# Patient Record
Sex: FEMALE | Race: WHITE | NOT HISPANIC OR LATINO
[De-identification: names, ages, dates, MRNs, and addresses within clinical notes are randomized per-mention and may not be internally consistent; named-entity substitution may affect disease eponyms.]

---

## 2022-02-01 ENCOUNTER — APPOINTMENT (OUTPATIENT)
Dept: OBGYN | Facility: CLINIC | Age: 67
End: 2022-02-01
Payer: MEDICARE

## 2022-02-01 VITALS
WEIGHT: 150 LBS | DIASTOLIC BLOOD PRESSURE: 72 MMHG | BODY MASS INDEX: 28.32 KG/M2 | HEIGHT: 61 IN | SYSTOLIC BLOOD PRESSURE: 116 MMHG

## 2022-02-01 DIAGNOSIS — B00.9 HERPESVIRAL INFECTION, UNSPECIFIED: ICD-10-CM

## 2022-02-01 DIAGNOSIS — Z11.51 ENCOUNTER FOR SCREENING FOR HUMAN PAPILLOMAVIRUS (HPV): ICD-10-CM

## 2022-02-01 DIAGNOSIS — N95.2 POSTMENOPAUSAL ATROPHIC VAGINITIS: ICD-10-CM

## 2022-02-01 PROBLEM — Z00.00 ENCOUNTER FOR PREVENTIVE HEALTH EXAMINATION: Status: ACTIVE | Noted: 2022-02-01

## 2022-02-01 PROCEDURE — G0101: CPT

## 2022-02-01 NOTE — PHYSICAL EXAM
[Chaperone Present] : A chaperone was present in the examining room during all aspects of the physical examination [Appropriately responsive] : appropriately responsive [Alert] : alert [No Acute Distress] : no acute distress [No Lymphadenopathy] : no lymphadenopathy [Soft] : soft [Non-tender] : non-tender [Non-distended] : non-distended [No HSM] : No HSM [No Lesions] : no lesions [No Mass] : no mass [Oriented x3] : oriented x3 [FreeTextEntry3] : mobile thyroid, no masses, no nodules [FreeTextEntry6] : No cervical or axillary lymphadenopathy. [Examination Of The Breasts] : a normal appearance [No Masses] : no breast masses were palpable [Labia Majora] : normal [Labia Minora] : normal [Atrophy] : atrophy [Normal] : normal [Uterine Adnexae] : normal [FreeTextEntry1] : no HSV lesions

## 2022-02-01 NOTE — HISTORY OF PRESENT ILLNESS
[N] : Patient does not use contraception [Y] : Positive pregnancy history [Menarche Age: ____] : age at menarche was [unfilled] [Currently Active] : currently active [Mammogramdate] : 2018 [TextBox_19] : as per pt  [PapSmeardate] : 2019 [TextBox_31] : as per pt  [ColonoscopyDate] : 2012 [TextBox_43] : as per pt  [LMPDate] : 2019 [PGHxTotal] : 2 [PGHxAbortions] : 2 [[facility] [FreeTextEntry1] : 2019

## 2022-02-01 NOTE — DISCUSSION/SUMMARY
[FreeTextEntry1] : 67 y/o \par \par #new patient gyn annual\par -pap, hpv\par -ASCCP guidelines discussed\par -recommend PCP; Dr. Martines's card given\par -recommend yearly derm visit\par -[facility], US script; task list; site list\par \par #hx of genital HSV\par -refill valtrex 500 mg daily suppression\par \par rto 1 yr or prn

## 2022-02-02 ENCOUNTER — NON-APPOINTMENT (OUTPATIENT)
Age: 67
End: 2022-02-02

## 2022-02-02 LAB — HPV HIGH+LOW RISK DNA PNL CVX: NOT DETECTED

## 2022-02-09 LAB — CYTOLOGY CVX/VAG DOC THIN PREP: ABNORMAL

## 2022-04-16 ENCOUNTER — NON-APPOINTMENT (OUTPATIENT)
Age: 67
End: 2022-04-16

## 2022-05-07 ENCOUNTER — NON-APPOINTMENT (OUTPATIENT)
Age: 67
End: 2022-05-07

## 2022-06-22 ENCOUNTER — NON-APPOINTMENT (OUTPATIENT)
Age: 67
End: 2022-06-22

## 2022-06-30 ENCOUNTER — NON-APPOINTMENT (OUTPATIENT)
Age: 67
End: 2022-06-30

## 2022-08-10 ENCOUNTER — APPOINTMENT (OUTPATIENT)
Dept: RADIOLOGY | Facility: CLINIC | Age: 67
End: 2022-08-10

## 2022-08-10 ENCOUNTER — APPOINTMENT (OUTPATIENT)
Dept: ULTRASOUND IMAGING | Facility: CLINIC | Age: 67
End: 2022-08-10

## 2022-08-10 ENCOUNTER — RESULT REVIEW (OUTPATIENT)
Age: 67
End: 2022-08-10

## 2022-08-10 ENCOUNTER — APPOINTMENT (OUTPATIENT)
Dept: MAMMOGRAPHY | Facility: CLINIC | Age: 67
End: 2022-08-10

## 2022-08-10 ENCOUNTER — OUTPATIENT (OUTPATIENT)
Dept: OUTPATIENT SERVICES | Facility: HOSPITAL | Age: 67
LOS: 1 days | End: 2022-08-10
Payer: MEDICARE

## 2022-08-10 DIAGNOSIS — Z12.39 ENCOUNTER FOR OTHER SCREENING FOR MALIGNANT NEOPLASM OF BREAST: ICD-10-CM

## 2022-08-10 PROCEDURE — 77063 BREAST TOMOSYNTHESIS BI: CPT

## 2022-08-10 PROCEDURE — 77067 SCR MAMMO BI INCL CAD: CPT | Mod: 26

## 2022-08-10 PROCEDURE — 77063 BREAST TOMOSYNTHESIS BI: CPT | Mod: 26

## 2022-08-10 PROCEDURE — 77067 SCR MAMMO BI INCL CAD: CPT

## 2022-12-20 ENCOUNTER — NON-APPOINTMENT (OUTPATIENT)
Age: 67
End: 2022-12-20

## 2023-02-07 ENCOUNTER — APPOINTMENT (OUTPATIENT)
Dept: OBGYN | Facility: CLINIC | Age: 68
End: 2023-02-07
Payer: MEDICARE

## 2023-02-07 ENCOUNTER — TRANSCRIPTION ENCOUNTER (OUTPATIENT)
Age: 68
End: 2023-02-07

## 2023-02-07 VITALS
WEIGHT: 151.8 LBS | SYSTOLIC BLOOD PRESSURE: 118 MMHG | BODY MASS INDEX: 28.66 KG/M2 | DIASTOLIC BLOOD PRESSURE: 72 MMHG | HEIGHT: 61 IN

## 2023-02-07 DIAGNOSIS — Z78.9 OTHER SPECIFIED HEALTH STATUS: ICD-10-CM

## 2023-02-07 DIAGNOSIS — Z13.820 ENCOUNTER FOR SCREENING FOR OSTEOPOROSIS: ICD-10-CM

## 2023-02-07 DIAGNOSIS — Z82.49 FAMILY HISTORY OF ISCHEMIC HEART DISEASE AND OTHER DISEASES OF THE CIRCULATORY SYSTEM: ICD-10-CM

## 2023-02-07 DIAGNOSIS — Z80.52 FAMILY HISTORY OF MALIGNANT NEOPLASM OF BLADDER: ICD-10-CM

## 2023-02-07 DIAGNOSIS — Z78.0 ASYMPTOMATIC MENOPAUSAL STATE: ICD-10-CM

## 2023-02-07 PROCEDURE — 99397 PER PM REEVAL EST PAT 65+ YR: CPT

## 2023-02-07 NOTE — PHYSICAL EXAM
[Chaperone Present] : A chaperone was present in the examining room during all aspects of the physical examination [FreeTextEntry1] : ALFRED [Appropriately responsive] : appropriately responsive [Alert] : alert [No Acute Distress] : no acute distress [No Lymphadenopathy] : no lymphadenopathy [Soft] : soft [Non-tender] : non-tender [Non-distended] : non-distended [Oriented x3] : oriented x3 [FreeTextEntry3] : mobile thyroid, no masses, no nodules [FreeTextEntry5] : . [FreeTextEntry6] : No cervical or axillary lymphadenopathy. [Examination Of The Breasts] : a normal appearance [No Masses] : no breast masses were palpable [Labia Majora] : normal [Labia Minora] : normal [No Bleeding] : There was no active vaginal bleeding [Normal] : normal [Uterine Adnexae] : normal

## 2023-02-07 NOTE — HISTORY OF PRESENT ILLNESS
[HPV test offered] : HPV test offered [Menarche Age: ____] : age at menarche was [unfilled] [No] : Patient does not have concerns regarding sex [postmenopausal] : postmenopausal [Y] : Patient is sexually active [Post-Menopause, No Sxs] : post-menopausal, currently without symptoms [Currently Active] : currently active [Mammogramdate] : 8/10/22 [TextBox_19] : BR1 [PapSmeardate] : 2/1/22 [TextBox_31] : NEG [HPVDate] : 2/1/22 [TextBox_78] : NEG [LMPDate] : 2019 [PGHxTotal] : 2 [PGHxAbortions] : 2 [[facility] : [facility] [FreeTextEntry1] : 2019

## 2023-04-16 ENCOUNTER — RX ONLY (RX ONLY)
Age: 68
End: 2023-04-16

## 2023-04-16 ENCOUNTER — OFFICE (OUTPATIENT)
Dept: URBAN - METROPOLITAN AREA CLINIC 1 | Facility: CLINIC | Age: 68
Setting detail: OPHTHALMOLOGY
End: 2023-04-16
Payer: COMMERCIAL

## 2023-04-16 DIAGNOSIS — H52.4: ICD-10-CM

## 2023-04-16 PROCEDURE — 92015 DETERMINE REFRACTIVE STATE: CPT

## 2023-04-16 PROCEDURE — 92014 COMPRE OPH EXAM EST PT 1/>: CPT

## 2023-04-16 ASSESSMENT — REFRACTION_AUTOREFRACTION
OD_CYLINDER: -1.00
OS_SPHERE: +2.75
OD_SPHERE: +3.00
OS_CYLINDER: -0.50
OD_AXIS: 53
OS_AXIS: 136

## 2023-04-16 ASSESSMENT — SPHEQUIV_DERIVED
OD_SPHEQUIV: 2.5
OD_SPHEQUIV: 2.25
OS_SPHEQUIV: 2.5
OS_SPHEQUIV: 2.5

## 2023-04-16 ASSESSMENT — CONFRONTATIONAL VISUAL FIELD TEST (CVF)
OD_FINDINGS: FULL
OS_FINDINGS: FULL

## 2023-04-16 ASSESSMENT — REFRACTION_MANIFEST
OD_CYLINDER: -1.00
OU_VA: 20/20
OS_VA1: 20/20
OS_SPHERE: +2.75
OS_AXIS: 135
OD_SPHERE: +2.75
OD_VA1: 20/20
OS_ADD: +2.50
OS_CYLINDER: -0.50
OD_ADD: +2.50
OD_AXIS: 058

## 2023-04-16 ASSESSMENT — REFRACTION_CURRENTRX
OD_AXIS: 60
OS_AXIS: 130
OS_SPHERE: +2.50
OD_ADD: +2.75
OS_CYLINDER: -0.50
OS_OVR_VA: 20/
OD_OVR_VA: 20/
OD_CYLINDER: -1.00
OD_SPHERE: +2.75
OS_ADD: +2.75

## 2023-04-16 ASSESSMENT — KERATOMETRY
OD_K1POWER_DIOPTERS: 45.00
OS_AXISANGLE_DEGREES: 71
OS_K1POWER_DIOPTERS: 44.50
OD_AXISANGLE_DEGREES: 116
OS_K2POWER_DIOPTERS: 45.75
OD_K2POWER_DIOPTERS: 46.00

## 2023-04-16 ASSESSMENT — TONOMETRY
OS_IOP_MMHG: 14
OD_IOP_MMHG: 18

## 2023-04-16 ASSESSMENT — AXIALLENGTH_DERIVED
OD_AL: 21.9987
OS_AL: 22.1191
OS_AL: 22.1191
OD_AL: 22.0837

## 2023-04-16 ASSESSMENT — VISUAL ACUITY
OS_BCVA: 20/25+2
OD_BCVA: 20/25+2

## 2023-06-29 ENCOUNTER — OUTPATIENT (OUTPATIENT)
Dept: OUTPATIENT SERVICES | Facility: HOSPITAL | Age: 68
LOS: 1 days | End: 2023-06-29
Payer: MEDICARE

## 2023-06-29 ENCOUNTER — APPOINTMENT (OUTPATIENT)
Dept: RADIOLOGY | Facility: CLINIC | Age: 68
End: 2023-06-29
Payer: MEDICARE

## 2023-06-29 DIAGNOSIS — Z78.0 ASYMPTOMATIC MENOPAUSAL STATE: ICD-10-CM

## 2023-06-29 DIAGNOSIS — Z13.820 ENCOUNTER FOR SCREENING FOR OSTEOPOROSIS: ICD-10-CM

## 2023-06-29 DIAGNOSIS — Z12.39 ENCOUNTER FOR OTHER SCREENING FOR MALIGNANT NEOPLASM OF BREAST: ICD-10-CM

## 2023-06-29 PROCEDURE — 77080 DXA BONE DENSITY AXIAL: CPT | Mod: 26

## 2023-06-29 PROCEDURE — 77080 DXA BONE DENSITY AXIAL: CPT

## 2023-07-03 ENCOUNTER — NON-APPOINTMENT (OUTPATIENT)
Age: 68
End: 2023-07-03

## 2023-08-16 ENCOUNTER — OUTPATIENT (OUTPATIENT)
Dept: OUTPATIENT SERVICES | Facility: HOSPITAL | Age: 68
LOS: 1 days | End: 2023-08-16
Payer: MEDICARE

## 2023-08-16 ENCOUNTER — APPOINTMENT (OUTPATIENT)
Dept: ULTRASOUND IMAGING | Facility: CLINIC | Age: 68
End: 2023-08-16

## 2023-08-16 ENCOUNTER — APPOINTMENT (OUTPATIENT)
Dept: MAMMOGRAPHY | Facility: CLINIC | Age: 68
End: 2023-08-16
Payer: MEDICARE

## 2023-08-16 ENCOUNTER — RESULT REVIEW (OUTPATIENT)
Age: 68
End: 2023-08-16

## 2023-08-16 DIAGNOSIS — Z12.39 ENCOUNTER FOR OTHER SCREENING FOR MALIGNANT NEOPLASM OF BREAST: ICD-10-CM

## 2023-08-16 PROCEDURE — 77067 SCR MAMMO BI INCL CAD: CPT

## 2023-08-16 PROCEDURE — 77067 SCR MAMMO BI INCL CAD: CPT | Mod: 26

## 2023-08-16 PROCEDURE — 77063 BREAST TOMOSYNTHESIS BI: CPT

## 2023-08-16 PROCEDURE — 77063 BREAST TOMOSYNTHESIS BI: CPT | Mod: 26

## 2024-02-12 ENCOUNTER — APPOINTMENT (OUTPATIENT)
Dept: OBGYN | Facility: CLINIC | Age: 69
End: 2024-02-12
Payer: MEDICARE

## 2024-02-12 VITALS
SYSTOLIC BLOOD PRESSURE: 122 MMHG | WEIGHT: 152.6 LBS | HEIGHT: 61 IN | BODY MASS INDEX: 28.81 KG/M2 | DIASTOLIC BLOOD PRESSURE: 74 MMHG

## 2024-02-12 DIAGNOSIS — N95.1 MENOPAUSAL AND FEMALE CLIMACTERIC STATES: ICD-10-CM

## 2024-02-12 DIAGNOSIS — Z01.419 ENCOUNTER FOR GYNECOLOGICAL EXAMINATION (GENERAL) (ROUTINE) W/OUT ABNORMAL FINDINGS: ICD-10-CM

## 2024-02-12 DIAGNOSIS — Z12.39 ENCOUNTER FOR OTHER SCREENING FOR MALIGNANT NEOPLASM OF BREAST: ICD-10-CM

## 2024-02-12 PROCEDURE — 99397 PER PM REEVAL EST PAT 65+ YR: CPT

## 2024-02-12 RX ORDER — VALACYCLOVIR 500 MG/1
500 TABLET, FILM COATED ORAL
Qty: 1 | Refills: 3 | Status: ACTIVE | COMMUNITY
Start: 2022-02-01 | End: 1900-01-01

## 2024-02-12 NOTE — PHYSICAL EXAM
[Chaperone Present] : A chaperone was present in the examining room during all aspects of the physical examination [FreeTextEntry1] : ALFRED [Appropriately responsive] : appropriately responsive [Alert] : alert [No Acute Distress] : no acute distress [No Lymphadenopathy] : no lymphadenopathy [Soft] : soft [Non-tender] : non-tender [Non-distended] : non-distended [Oriented x3] : oriented x3 [FreeTextEntry3] : mobile thyroid, no masses, no nodules [FreeTextEntry6] : No cervical or axillary lymphadenopathy. [Examination Of The Breasts] : a normal appearance [No Masses] : no breast masses were palpable [Labia Majora] : normal [Labia Minora] : normal [Normal] : normal [Uterine Adnexae] : normal

## 2024-02-12 NOTE — HISTORY OF PRESENT ILLNESS
[HPV test offered] : HPV test offered [Menarche Age: ____] : age at menarche was [unfilled] [Y] : Patient is sexually active [No] : Patient does not have concerns regarding sex [Currently Active] : currently active [Mammogramdate] : 8/16/23 [TextBox_19] : BR1 [PapSmeardate] : 2/1/22 [TextBox_31] : NEG [ColonoscopyDate] : >10 YEARS [TextBox_43] : PER PT [HPVDate] : 2/1/22 [TextBox_78] : NEG [LMPDate] : @ AGE 53 [PGHxTotal] : 2 [PGHxAbortions] : 2 [[facility] : [facility] [FreeTextEntry1] : @ AGE 53

## 2024-04-16 ENCOUNTER — RX ONLY (RX ONLY)
Age: 69
End: 2024-04-16

## 2024-04-16 ENCOUNTER — OFFICE (OUTPATIENT)
Dept: URBAN - METROPOLITAN AREA CLINIC 1 | Facility: CLINIC | Age: 69
Setting detail: OPHTHALMOLOGY
End: 2024-04-16
Payer: COMMERCIAL

## 2024-04-16 DIAGNOSIS — Z01.00: ICD-10-CM

## 2024-04-16 PROBLEM — Z83.511 FAMILY HISTORY OF GLAUCOMA: Status: ACTIVE | Noted: 2024-04-16

## 2024-04-16 PROBLEM — H43.812 POSTERIOR VITREOUS DETACHMENT; LEFT EYE: Status: ACTIVE | Noted: 2024-04-16

## 2024-04-16 PROCEDURE — 92014 COMPRE OPH EXAM EST PT 1/>: CPT

## 2024-08-19 ENCOUNTER — APPOINTMENT (OUTPATIENT)
Dept: MAMMOGRAPHY | Facility: CLINIC | Age: 69
End: 2024-08-19
Payer: MEDICARE

## 2024-08-19 ENCOUNTER — RESULT REVIEW (OUTPATIENT)
Age: 69
End: 2024-08-19

## 2024-08-19 ENCOUNTER — APPOINTMENT (OUTPATIENT)
Dept: ULTRASOUND IMAGING | Facility: CLINIC | Age: 69
End: 2024-08-19
Payer: MEDICARE

## 2024-08-19 ENCOUNTER — OUTPATIENT (OUTPATIENT)
Dept: OUTPATIENT SERVICES | Facility: HOSPITAL | Age: 69
LOS: 1 days | End: 2024-08-19
Payer: MEDICARE

## 2024-08-19 DIAGNOSIS — Z01.419 ENCOUNTER FOR GYNECOLOGICAL EXAMINATION (GENERAL) (ROUTINE) WITHOUT ABNORMAL FINDINGS: ICD-10-CM

## 2024-08-19 DIAGNOSIS — Z12.39 ENCOUNTER FOR OTHER SCREENING FOR MALIGNANT NEOPLASM OF BREAST: ICD-10-CM

## 2024-08-19 PROCEDURE — 77063 BREAST TOMOSYNTHESIS BI: CPT | Mod: 26

## 2024-08-19 PROCEDURE — 77067 SCR MAMMO BI INCL CAD: CPT | Mod: 26

## 2024-08-19 PROCEDURE — 77067 SCR MAMMO BI INCL CAD: CPT

## 2024-08-19 PROCEDURE — 77063 BREAST TOMOSYNTHESIS BI: CPT

## 2025-02-26 ENCOUNTER — APPOINTMENT (OUTPATIENT)
Dept: OBGYN | Facility: CLINIC | Age: 70
End: 2025-02-26
Payer: MEDICARE

## 2025-02-26 VITALS
SYSTOLIC BLOOD PRESSURE: 118 MMHG | WEIGHT: 136 LBS | BODY MASS INDEX: 25.68 KG/M2 | DIASTOLIC BLOOD PRESSURE: 74 MMHG | HEIGHT: 61 IN

## 2025-02-26 DIAGNOSIS — Z01.419 ENCOUNTER FOR GYNECOLOGICAL EXAMINATION (GENERAL) (ROUTINE) W/OUT ABNORMAL FINDINGS: ICD-10-CM

## 2025-02-26 DIAGNOSIS — Z12.39 ENCOUNTER FOR OTHER SCREENING FOR MALIGNANT NEOPLASM OF BREAST: ICD-10-CM

## 2025-02-26 PROCEDURE — 99397 PER PM REEVAL EST PAT 65+ YR: CPT

## 2025-02-26 PROCEDURE — 99459 PELVIC EXAMINATION: CPT

## 2025-04-18 ENCOUNTER — OFFICE (OUTPATIENT)
Dept: URBAN - METROPOLITAN AREA CLINIC 1 | Facility: CLINIC | Age: 70
Setting detail: OPHTHALMOLOGY
End: 2025-04-18
Payer: COMMERCIAL

## 2025-04-18 DIAGNOSIS — Z01.00: ICD-10-CM

## 2025-04-18 PROBLEM — H11.153 PINGUECULA; BOTH EYES: Status: ACTIVE | Noted: 2025-04-18

## 2025-04-18 PROCEDURE — 92014 COMPRE OPH EXAM EST PT 1/>: CPT

## 2025-04-18 ASSESSMENT — REFRACTION_MANIFEST
OD_ADD: +2.50
OD_VA1: 20/20
OS_ADD: +2.50
OD_AXIS: 65
OS_SPHERE: +2.75
OD_CYLINDER: -1.00
OS_CYLINDER: -0.75
OS_AXIS: 125
OD_ADD: +2.50
OS_ADD: +2.50
OS_AXIS: 135
OD_SPHERE: +2.75
OS_SPHERE: +2.50
OS_VA1: 20/20
OD_AXIS: 058
OD_SPHERE: +2.75
OD_CYLINDER: -1.00
OU_VA: 20/20
OS_CYLINDER: -0.50

## 2025-04-18 ASSESSMENT — REFRACTION_AUTOREFRACTION
OD_SPHERE: +3.25
OD_CYLINDER: -1.00
OS_SPHERE: +3.00
OD_AXIS: 061
OS_CYLINDER: -0.75
OS_AXIS: 126

## 2025-04-18 ASSESSMENT — REFRACTION_CURRENTRX
OS_SPHERE: +2.50
OS_VPRISM_DIRECTION: PROGS
OD_CYLINDER: -1.00
OS_OVR_VA: 20/
OS_AXIS: 124
OD_OVR_VA: 20/
OS_CYLINDER: -0.50
OS_ADD: +2.75
OD_AXIS: 061
OD_ADD: +2.75
OD_VPRISM_DIRECTION: PROGS
OD_SPHERE: +2.75

## 2025-04-18 ASSESSMENT — KERATOMETRY
OD_K2POWER_DIOPTERS: 45.50
METHOD_AUTO_MANUAL: AUTO
OD_AXISANGLE_DEGREES: 114
OD_K1POWER_DIOPTERS: 44.75
OS_K2POWER_DIOPTERS: 45.25
OS_AXISANGLE_DEGREES: 069
OS_K1POWER_DIOPTERS: 44.25

## 2025-04-18 ASSESSMENT — TONOMETRY: OD_IOP_MMHG: 18

## 2025-04-18 ASSESSMENT — VISUAL ACUITY
OD_BCVA: 20/20-1
OS_BCVA: 20/20-2

## 2025-04-18 ASSESSMENT — CONFRONTATIONAL VISUAL FIELD TEST (CVF)
OD_FINDINGS: FULL
OS_FINDINGS: FULL

## 2025-08-21 ENCOUNTER — OUTPATIENT (OUTPATIENT)
Dept: OUTPATIENT SERVICES | Facility: HOSPITAL | Age: 70
LOS: 1 days | End: 2025-08-21
Payer: MEDICARE

## 2025-08-21 ENCOUNTER — APPOINTMENT (OUTPATIENT)
Dept: MAMMOGRAPHY | Facility: CLINIC | Age: 70
End: 2025-08-21
Payer: MEDICARE

## 2025-08-21 ENCOUNTER — RESULT REVIEW (OUTPATIENT)
Age: 70
End: 2025-08-21

## 2025-08-21 ENCOUNTER — APPOINTMENT (OUTPATIENT)
Dept: ULTRASOUND IMAGING | Facility: CLINIC | Age: 70
End: 2025-08-21
Payer: MEDICARE

## 2025-08-21 DIAGNOSIS — Z00.8 ENCOUNTER FOR OTHER GENERAL EXAMINATION: ICD-10-CM

## 2025-08-21 DIAGNOSIS — Z12.39 ENCOUNTER FOR OTHER SCREENING FOR MALIGNANT NEOPLASM OF BREAST: ICD-10-CM

## 2025-08-21 PROCEDURE — 77067 SCR MAMMO BI INCL CAD: CPT

## 2025-08-21 PROCEDURE — 76641 ULTRASOUND BREAST COMPLETE: CPT | Mod: 26,50

## 2025-08-21 PROCEDURE — 77063 BREAST TOMOSYNTHESIS BI: CPT | Mod: 26

## 2025-08-21 PROCEDURE — 76641 ULTRASOUND BREAST COMPLETE: CPT

## 2025-08-21 PROCEDURE — 77067 SCR MAMMO BI INCL CAD: CPT | Mod: 26

## 2025-08-21 PROCEDURE — 77063 BREAST TOMOSYNTHESIS BI: CPT
